# Patient Record
Sex: FEMALE | Race: WHITE | NOT HISPANIC OR LATINO | Employment: OTHER | URBAN - METROPOLITAN AREA
[De-identification: names, ages, dates, MRNs, and addresses within clinical notes are randomized per-mention and may not be internally consistent; named-entity substitution may affect disease eponyms.]

---

## 2023-03-09 ENCOUNTER — TELEPHONE (OUTPATIENT)
Dept: PRIMARY CARE | Facility: CLINIC | Age: 63
End: 2023-03-09

## 2023-08-23 DIAGNOSIS — J45.909 ACUTE ASTHMATIC BRONCHITIS (HHS-HCC): Primary | ICD-10-CM

## 2023-08-23 PROBLEM — G56.01 CARPAL TUNNEL SYNDROME, RIGHT: Status: ACTIVE | Noted: 2023-08-23

## 2023-08-23 PROBLEM — M85.80 OSTEOPENIA: Status: ACTIVE | Noted: 2023-08-23

## 2023-08-23 PROBLEM — G56.21 CUBITAL TUNNEL SYNDROME ON RIGHT: Status: ACTIVE | Noted: 2023-08-23

## 2023-08-23 PROBLEM — M54.31 RIGHT SIDED SCIATICA: Status: ACTIVE | Noted: 2023-08-23

## 2023-08-23 PROBLEM — M15.9 GENERALIZED OSTEOARTHRITIS: Status: ACTIVE | Noted: 2023-08-23

## 2023-08-23 RX ORDER — CLARITHROMYCIN 500 MG/1
TABLET, FILM COATED ORAL
COMMUNITY
Start: 2022-12-09 | End: 2024-01-31 | Stop reason: ALTCHOICE

## 2023-08-23 RX ORDER — ALBUTEROL SULFATE 90 UG/1
AEROSOL, METERED RESPIRATORY (INHALATION)
COMMUNITY
Start: 2017-03-27

## 2023-08-23 RX ORDER — DIFLUPREDNATE OPHTHALMIC 0.5 MG/ML
EMULSION OPHTHALMIC
COMMUNITY
Start: 2023-02-09 | End: 2024-01-31 | Stop reason: ALTCHOICE

## 2023-08-23 RX ORDER — LATANOPROST 50 UG/ML
SOLUTION/ DROPS OPHTHALMIC
COMMUNITY
Start: 2022-12-31

## 2023-08-23 RX ORDER — OFLOXACIN 3 MG/ML
SOLUTION/ DROPS OPHTHALMIC
COMMUNITY
Start: 2022-10-10 | End: 2024-01-31 | Stop reason: ALTCHOICE

## 2023-08-23 RX ORDER — FLUTICASONE PROPIONATE AND SALMETEROL 250; 50 UG/1; UG/1
1 POWDER RESPIRATORY (INHALATION) 2 TIMES DAILY
Qty: 60 EACH | Refills: 11 | Status: SHIPPED | OUTPATIENT
Start: 2023-08-23

## 2023-08-23 RX ORDER — FLUTICASONE PROPIONATE AND SALMETEROL 50; 250 UG/1; UG/1
1 POWDER RESPIRATORY (INHALATION) 2 TIMES DAILY
COMMUNITY
Start: 2023-01-26 | End: 2023-08-23 | Stop reason: SDUPTHER

## 2023-08-23 RX ORDER — PREDNISONE 20 MG/1
TABLET ORAL
COMMUNITY
Start: 2023-01-05 | End: 2024-01-31 | Stop reason: ALTCHOICE

## 2023-08-23 RX ORDER — BRIMONIDINE TARTRATE AND TIMOLOL MALEATE 2; 5 MG/ML; MG/ML
SOLUTION OPHTHALMIC
COMMUNITY
Start: 2023-01-21 | End: 2024-01-31 | Stop reason: ALTCHOICE

## 2023-08-23 RX ORDER — PEAK FLOW METER
EACH MISCELLANEOUS
COMMUNITY
Start: 2022-12-09

## 2023-08-23 RX ORDER — PREDNISOLONE ACETATE 10 MG/ML
SUSPENSION/ DROPS OPHTHALMIC
COMMUNITY
Start: 2022-12-03 | End: 2024-01-31 | Stop reason: ALTCHOICE

## 2023-08-23 RX ORDER — IPRATROPIUM BROMIDE AND ALBUTEROL SULFATE 2.5; .5 MG/3ML; MG/3ML
SOLUTION RESPIRATORY (INHALATION)
COMMUNITY
Start: 2023-01-03

## 2023-08-23 RX ORDER — AZITHROMYCIN 250 MG/1
TABLET, FILM COATED ORAL
COMMUNITY
End: 2024-01-31 | Stop reason: ALTCHOICE

## 2024-01-31 ENCOUNTER — OFFICE VISIT (OUTPATIENT)
Dept: PRIMARY CARE | Facility: CLINIC | Age: 64
End: 2024-01-31
Payer: COMMERCIAL

## 2024-01-31 VITALS
OXYGEN SATURATION: 97 % | SYSTOLIC BLOOD PRESSURE: 130 MMHG | WEIGHT: 138.6 LBS | HEART RATE: 79 BPM | BODY MASS INDEX: 23.79 KG/M2 | DIASTOLIC BLOOD PRESSURE: 82 MMHG

## 2024-01-31 DIAGNOSIS — J06.9 URI, ACUTE: Primary | ICD-10-CM

## 2024-01-31 PROCEDURE — 1036F TOBACCO NON-USER: CPT | Performed by: STUDENT IN AN ORGANIZED HEALTH CARE EDUCATION/TRAINING PROGRAM

## 2024-01-31 PROCEDURE — 99213 OFFICE O/P EST LOW 20 MIN: CPT | Performed by: STUDENT IN AN ORGANIZED HEALTH CARE EDUCATION/TRAINING PROGRAM

## 2024-01-31 RX ORDER — AZITHROMYCIN 250 MG/1
TABLET, FILM COATED ORAL
Qty: 6 TABLET | Refills: 0 | Status: SHIPPED | OUTPATIENT
Start: 2024-01-31 | End: 2024-02-04

## 2024-01-31 RX ORDER — METHYLPREDNISOLONE 4 MG/1
TABLET ORAL
Qty: 21 TABLET | Refills: 0 | Status: SHIPPED | OUTPATIENT
Start: 2024-01-31

## 2024-01-31 RX ORDER — BENZONATATE 200 MG/1
200 CAPSULE ORAL 3 TIMES DAILY PRN
Qty: 45 CAPSULE | Refills: 0 | Status: SHIPPED | OUTPATIENT
Start: 2024-01-31 | End: 2024-02-15

## 2024-01-31 ASSESSMENT — ENCOUNTER SYMPTOMS: DEPRESSION: 0

## 2024-01-31 ASSESSMENT — PAIN SCALES - GENERAL: PAINLEVEL: 2

## 2024-01-31 NOTE — PATIENT INSTRUCTIONS
1.  Lingering upper respiratory issues postnasal drip irritation and sinus congestion.  Antibiotic and steroid each as directed Tessalon Perles as needed for cough.    Drinking plenty of fluids and getting lots of rest. Chicken soup and hot beverages may help.  Trial of nasal irrigation with a Nettipot or squeeze bottle with sterile salt water.  Nasal spray corticosteroids (Flonase) may help in reducing the inflammatory response in the nasal passages and airways. Please try 2 sprays each nostril daily for 2 weeks.  If you have season allergies, please take a daily antihistamine of your choice, such as Zyrtec/Claritin/Allegra at bedtime.  Take Tylenol or Motrin/Aleve for sinus or ear pain.  If you have good blood pressure you can try pseudofed (you must ask the pharmacist for it and show ID).  Please take your antibiotic until all gone. Also take a probiotic or daily yogurt to help reduce common side effects of upset stomach and diarrhea.  Please call or return to the office if you are not feeling better in the next 3-4 days after starting treatment.

## 2024-01-31 NOTE — PROGRESS NOTES
Subjective   Patient ID: Melody Santos is a 63 y.o. female who presents for Cough, Oral Pain, Rib Injury (Left side.), and Med Refill.    HPI comes in with upper respiratory sinus pressure congestion irritating cough symptoms for 3 to 4 weeks    Review of Systems  Constitutional: Symptoms as per history of present   Eyes: no blurred vision or visual disturbance  ENT: no hearing loss, positive symptoms as per history of present illness  Cardiovascular: no chest pain, no edema, no palps and no syncope.   Respiratory: symptoms as per history of present illness  Gastrointestinal: no abdominal pain, No C/D no N/V, no blood in stools  Genitourinary: no dysuria, no change in urinary frequency, no urinary hesitancy and no feelings of urinary urgency.   Musculoskeletal: no arthralgias,  no back pain and no myalgias.   Neurological: no difficulty walking, no headache, no limb weakness, no numbness and no tingling.    Objective   /82 (BP Location: Left arm, Patient Position: Sitting, BP Cuff Size: Adult)   Pulse 79   Wt 62.9 kg (138 lb 9.6 oz)   SpO2 97%   BMI 23.79 kg/m²     Physical Exam  gen- a & o x 3, positive coughing  heent- eomi, perrla, positive fluid behind TM's however non-erythematous, positive postnasal drip, positive rhinorrhea inflamed mucous membranes in the nasopharynx, positive sinus TTP  neck- positive cervical lymphadenopathy  heart- rrr, no murmurs  lungs- cta b/l , no w/r/r  chest- symmetric, nontender  ab- soft, nontender, no organomegaly, +bowel sounds  ex's- no c/c/e  neuro- CNs 2-12 grossly intact, full sensation and strength in all ex's    Assessment/Plan     1.  Lingering upper respiratory issues postnasal drip irritation and sinus congestion.  Antibiotic and steroid each as directed Tessalon Perles as needed for cough.    Drinking plenty of fluids and getting lots of rest. Chicken soup and hot beverages may help.  Trial of nasal irrigation with a Nettipot or squeeze bottle with  sterile salt water.  Nasal spray corticosteroids (Flonase) may help in reducing the inflammatory response in the nasal passages and airways. Please try 2 sprays each nostril daily for 2 weeks.  If you have season allergies, please take a daily antihistamine of your choice, such as Zyrtec/Claritin/Allegra at bedtime.  Take Tylenol or Motrin/Aleve for sinus or ear pain.  If you have good blood pressure you can try pseudofed (you must ask the pharmacist for it and show ID).  Please take your antibiotic until all gone. Also take a probiotic or daily yogurt to help reduce common side effects of upset stomach and diarrhea.  Please call or return to the office if you are not feeling better in the next 3-4 days after starting treatment.

## 2024-02-05 ENCOUNTER — APPOINTMENT (OUTPATIENT)
Dept: PRIMARY CARE | Facility: CLINIC | Age: 64
End: 2024-02-05
Payer: COMMERCIAL

## 2024-06-28 DIAGNOSIS — J06.9 URI, ACUTE: Primary | ICD-10-CM

## 2024-07-02 RX ORDER — ALBUTEROL SULFATE 90 UG/1
2 AEROSOL, METERED RESPIRATORY (INHALATION) EVERY 6 HOURS PRN
Qty: 18 G | Refills: 3 | Status: SHIPPED | OUTPATIENT
Start: 2024-07-02

## 2025-03-26 ENCOUNTER — OFFICE VISIT (OUTPATIENT)
Dept: URGENT CARE | Age: 65
End: 2025-03-26
Payer: MEDICARE

## 2025-03-26 VITALS
WEIGHT: 146 LBS | SYSTOLIC BLOOD PRESSURE: 142 MMHG | DIASTOLIC BLOOD PRESSURE: 72 MMHG | TEMPERATURE: 97.8 F | BODY MASS INDEX: 25.87 KG/M2 | HEART RATE: 79 BPM | OXYGEN SATURATION: 96 % | RESPIRATION RATE: 16 BRPM | HEIGHT: 63 IN

## 2025-03-26 DIAGNOSIS — J45.41 MODERATE PERSISTENT ASTHMA WITH EXACERBATION (HHS-HCC): Primary | ICD-10-CM

## 2025-03-26 DIAGNOSIS — B37.0 THRUSH: ICD-10-CM

## 2025-03-26 PROCEDURE — 1036F TOBACCO NON-USER: CPT | Performed by: PHYSICIAN ASSISTANT

## 2025-03-26 PROCEDURE — 99203 OFFICE O/P NEW LOW 30 MIN: CPT | Performed by: PHYSICIAN ASSISTANT

## 2025-03-26 PROCEDURE — 1159F MED LIST DOCD IN RCRD: CPT | Performed by: PHYSICIAN ASSISTANT

## 2025-03-26 PROCEDURE — 3008F BODY MASS INDEX DOCD: CPT | Performed by: PHYSICIAN ASSISTANT

## 2025-03-26 RX ORDER — PREDNISONE 10 MG/1
TABLET ORAL
Qty: 30 TABLET | Refills: 0 | Status: SHIPPED | OUTPATIENT
Start: 2025-03-26

## 2025-03-26 RX ORDER — NYSTATIN 100000 [USP'U]/ML
5 SUSPENSION ORAL 4 TIMES DAILY
Qty: 140 ML | Refills: 2 | Status: SHIPPED | OUTPATIENT
Start: 2025-03-26 | End: 2025-04-02

## 2025-03-26 RX ORDER — ALBUTEROL SULFATE 90 UG/1
2 INHALANT RESPIRATORY (INHALATION) EVERY 6 HOURS PRN
Qty: 18 G | Refills: 0 | Status: SHIPPED | OUTPATIENT
Start: 2025-03-26 | End: 2026-03-26

## 2025-03-26 RX ORDER — FLUTICASONE PROPIONATE AND SALMETEROL 250; 50 UG/1; UG/1
1 POWDER RESPIRATORY (INHALATION)
Qty: 60 EACH | Refills: 2 | Status: SHIPPED | OUTPATIENT
Start: 2025-03-26 | End: 2026-03-26

## 2025-03-26 ASSESSMENT — PATIENT HEALTH QUESTIONNAIRE - PHQ9
2. FEELING DOWN, DEPRESSED OR HOPELESS: NOT AT ALL
SUM OF ALL RESPONSES TO PHQ9 QUESTIONS 1 AND 2: 0
1. LITTLE INTEREST OR PLEASURE IN DOING THINGS: NOT AT ALL

## 2025-03-26 NOTE — PROGRESS NOTES
Subjective   Patient ID: Melody Santos is a 65 y.o. female. They present today with a chief complaint of Asthma exacerbation (Patient had an asthma attack yesterday, used inhaler and nebulizer).    History of Present Illness  HPI     65-year-old patient presents to clinic with complaints of asthma attack yesterday.  Reports had multiple episodes of harsh dry cough with associated wheezing and shortness of breath which was not relieved with albuterol inhaler initially but then was relieved with nebulized albuterol   After patient stopped taking Advair about 4 days ago due to thrush in the mouth.   Reports albuterol at home is  and Advair inhaler patient has has also .  Reports has history of asthma.    Reports had to change PCPs and does not have an appointment with new PCP until  which is why medications are .  Denies fevers, chills, nausea, vomiting, dizziness, shortness of breath currently, nasal congestion, sore throat, ear pain, sinus pain.  Past Medical History  Allergies as of 2025 - Reviewed 2025   Allergen Reaction Noted    Penicillins Anaphylaxis 2023       (Not in a hospital admission)       Past Medical History:   Diagnosis Date    Acute pharyngitis, unspecified     Sore throat    Fracture of one rib, left side, initial encounter for closed fracture     Left rib fracture    Personal history of diseases of the skin and subcutaneous tissue     History of alopecia    Personal history of other diseases of the respiratory system     History of sinusitis    Personal history of other infectious and parasitic diseases     History of herpes zoster    Personal history of other specified conditions     History of headache    Rash and other nonspecific skin eruption     Rash       Past Surgical History:   Procedure Laterality Date    BACK SURGERY  2016    Back Surgery    FOOT SURGERY  2016    Foot Surgery Right    OTHER SURGICAL HISTORY  06/15/2016    Spinal  "Diskectomy Lumbar        reports that she quit smoking about 25 years ago. Her smoking use included cigarettes. She has never used smokeless tobacco. She reports current alcohol use. She reports that she does not use drugs.    Review of Systems  Review of Systems     ROS negative with the exception as noted on HPI                            Objective    Vitals:    03/26/25 1124   BP: 142/72   BP Location: Left arm   Pulse: 79   Resp: 16   Temp: 36.6 °C (97.8 °F)   SpO2: 96%   Weight: 66.2 kg (146 lb)   Height: 1.6 m (5' 3\")     No LMP recorded.    Physical Exam  Constitutional:       Appearance: Normal appearance.   HENT:      Head: Normocephalic and atraumatic.      Right Ear: Tympanic membrane, ear canal and external ear normal.      Left Ear: Tympanic membrane, ear canal and external ear normal.      Nose: No mucosal edema or rhinorrhea.      Right Sinus: No maxillary sinus tenderness or frontal sinus tenderness.      Left Sinus: No maxillary sinus tenderness or frontal sinus tenderness.      Mouth/Throat:      Lips: Pink.      Mouth: Mucous membranes are moist. No oral lesions.      Dentition: Normal dentition. No gingival swelling.      Tongue: No lesions. Tongue does not deviate from midline.      Palate: No mass and lesions.      Pharynx: No pharyngeal swelling, posterior oropharyngeal erythema, uvula swelling or postnasal drip.      Comments: Erythematous   Tongue with white film.  Cardiovascular:      Rate and Rhythm: Normal rate and regular rhythm.      Heart sounds: No murmur heard.  Pulmonary:      Effort: Pulmonary effort is normal. No respiratory distress.      Breath sounds: No stridor. Wheezing present. No rhonchi or rales.   Lymphadenopathy:      Cervical: No cervical adenopathy.   Neurological:      Mental Status: She is alert.         Procedures    Point of Care Test & Imaging Results from this visit  No results found for this visit on 03/26/25.   No results found.    Diagnostic study results (if " any) were reviewed by Eileen Teixeira PA-C.    Assessment/Plan   Allergies, medications, history, and pertinent labs/EKGs/Imaging reviewed by Eileen Teixeira PA-C.   asthma attack yesterday.  Reports had multiple episodes of harsh dry cough with associated wheezing and shortness of breath which was not relieved with albuterol inhaler initially but then was relieved with nebulized albuterol   After patient stopped taking Advair about 4 days ago due to thrush in the mouth.   Prednisone taper started for asthma exacerbation.  Advair refilled.  Albuterol refilled.  Patient reports Advair was $500 last time patient tried to  a new inhaler.  Discussed with patient if unable to play for Advair or if it is still very expensive patient can call back today and I will send in budesonide for the nebulizer until patient is able to see PCP.  Will start nystatin swish for thrush in the mouth. Advised to drink plenty of fluids, run a cool-mist humidifier in room at night, and get plenty of rest. Pt. is advised to take 10 deep breaths and hours and continue to walk around every couple of hours. Patient should avoid over-exertion and reduce exposure to irritants such as smoke, cold, dry air, and dust.  Risk, benefits, and potential side effects of medication(s) discussed with pt. Discussed disease/illness presentation, treatment options, progression, complications, and outcomes with patient. Pt. Has expressed understanding and is an agreement of plan of care.     Medical Decision Making      Orders and Diagnoses  Diagnoses and all orders for this visit:  Moderate persistent asthma with exacerbation ([facility])  -     predniSONE (Deltasone) 10 mg tablet; 4 tablets x 3 days, followed by 3 tablets x 3 days, followed by 2 tablets x 3 days, followed by 1 tablet x 3 days.  -     albuterol 90 mcg/actuation inhaler; Inhale 2 puffs every 6 hours if needed for wheezing.  -     fluticasone propion-salmeteroL (Advair Diskus)  250-50 mcg/dose diskus inhaler; Inhale 1 puff 2 times a day. Rinse mouth with water after use to reduce aftertaste and incidence of candidiasis. Do not swallow.  Thrush  -     nystatin (Mycostatin) 100,000 unit/mL suspension; Take 5 mL (500,000 Units) by mouth 4 times a day for 7 days.      Medical Admin Record      Patient disposition: Home    Electronically signed by Eileen Teixeira PA-C  12:00 PM

## 2025-07-03 ENCOUNTER — OFFICE VISIT (OUTPATIENT)
Dept: URGENT CARE | Age: 65
End: 2025-07-03
Payer: MEDICARE

## 2025-07-03 ENCOUNTER — ANCILLARY PROCEDURE (OUTPATIENT)
Dept: URGENT CARE | Age: 65
End: 2025-07-03
Payer: MEDICARE

## 2025-07-03 VITALS
SYSTOLIC BLOOD PRESSURE: 151 MMHG | WEIGHT: 140 LBS | OXYGEN SATURATION: 97 % | DIASTOLIC BLOOD PRESSURE: 81 MMHG | HEIGHT: 63 IN | BODY MASS INDEX: 24.8 KG/M2 | RESPIRATION RATE: 17 BRPM | TEMPERATURE: 97.8 F | HEART RATE: 65 BPM

## 2025-07-03 DIAGNOSIS — S29.9XXA RIB INJURY: Primary | ICD-10-CM

## 2025-07-03 DIAGNOSIS — S22.41XA CLOSED FRACTURE OF MULTIPLE RIBS OF RIGHT SIDE, INITIAL ENCOUNTER: ICD-10-CM

## 2025-07-03 DIAGNOSIS — S29.9XXA RIB INJURY: ICD-10-CM

## 2025-07-03 PROCEDURE — 71101 X-RAY EXAM UNILAT RIBS/CHEST: CPT | Mod: RIGHT SIDE | Performed by: PHYSICIAN ASSISTANT

## 2025-07-03 ASSESSMENT — PATIENT HEALTH QUESTIONNAIRE - PHQ9
2. FEELING DOWN, DEPRESSED OR HOPELESS: NOT AT ALL
1. LITTLE INTEREST OR PLEASURE IN DOING THINGS: NOT AT ALL
SUM OF ALL RESPONSES TO PHQ9 QUESTIONS 1 AND 2: 0

## 2025-07-03 ASSESSMENT — PAIN SCALES - GENERAL: PAINLEVEL_OUTOF10: 8

## 2025-07-03 NOTE — PATIENT INSTRUCTIONS
Pt. Is advised to take 10 to 15 slow deep breaths at least 4 times each day. My try holding a pillow to your chest when taking deep breaths, sneeze, cough, or laugh to help with the pain. May try sleeping head and shoulders propped up on pillows to help with pain.  Try applying cold compresses/ice to the area.  Try NSAIDs/Tylenol as needed for pain.  Monitor for signs of worsening pain, dizziness, shortness of breath, coughing up blood, signs of infection such as fevers, chills, nausea, vomiting and if these occur proceed to the ED.    Follow up with pcp in 2-4 weeks to make sure ribs are healing well.

## 2025-07-03 NOTE — PROGRESS NOTES
"Subjective   Patient ID: Melody Santos is a 65 y.o. female. They present today with a chief complaint of Injury (Hit ribs on arm of couch x 2 weeks ago ).    History of Present Illness    Injury    65-year-old patient presents to clinic with complaints of right lateral and anterior distal rib pain  ongoing for the past 2 weeks which occurred after patient hit the area against the arm of a chair.  Reports pain is worse with deep breaths, sneezing, rotational movements of the chest.   Reports the pain is sharp and moderate.   Denies fevers, chills, shortness of breath, cough, hemoptysis, numbness, tingling.    Past Medical History  Allergies as of 07/03/2025 - Reviewed 07/03/2025   Allergen Reaction Noted    Penicillins Anaphylaxis 08/23/2023       Prescriptions Prior to Admission[1]     Medical History[2]    Surgical History[3]     reports that she quit smoking about 25 years ago. Her smoking use included cigarettes. She has never used smokeless tobacco. She reports current alcohol use. She reports that she does not use drugs.    Review of Systems  Review of Systems  ROS negative with the exception as noted on HPI                           Objective    Vitals:    07/03/25 1029   BP: 151/81   Pulse: 65   Resp: 17   Temp: 36.6 °C (97.8 °F)   TempSrc: Oral   SpO2: 97%   Weight: 63.5 kg (140 lb)   Height: 1.6 m (5' 3\")     No LMP recorded (lmp unknown). Patient is postmenopausal.    Physical Exam  Constitutional:       Appearance: Normal appearance.   HENT:      Head: Normocephalic and atraumatic.   Cardiovascular:      Rate and Rhythm: Normal rate and regular rhythm.      Pulses: Normal pulses.      Heart sounds: Normal heart sounds.   Pulmonary:      Effort: Pulmonary effort is normal. No respiratory distress.      Breath sounds: Normal breath sounds. No stridor. No wheezing, rhonchi or rales.   Chest:      Chest wall: Tenderness (right anterior and lateral distal ribs tender to palpation) present.   Neurological: "      Mental Status: She is alert.      Sensory: No sensory deficit.      Motor: No weakness.      Deep Tendon Reflexes: Reflexes normal.         Procedures    Point of Care Test & Imaging Results from this visit  No results found for this visit on 07/03/25.   Imaging  XR ribs right 2 views w chest pa or ap  Result Date: 7/3/2025  Right anterolateral 7th and 8th rib fractures are favored subacute given reported injury 2 weeks prior.     MACRO: None   Signed by: James Bernal 7/3/2025 11:37 AM Dictation workstation:   [id number]      Cardiology, Vascular, and Other Imaging  No other imaging results found for the past 2 days      Diagnostic study results (if any) were reviewed by Eileen Teixeira PA-C.    Assessment/Plan   Allergies, medications, history, and pertinent labs/EKGs/Imaging reviewed by Eileen Teixeira PA-C.   right lateral and anterior distal rib pain  ongoing for the past 2 weeks which occurred after patient hit the area against the arm of a chair. Pt. Is advised to take 10 to 15 slow deep breaths at least 4 times each day. My try holding a pillow to your chest when taking deep breaths, sneeze, cough, or laugh to help with the pain. May try sleeping head and shoulders propped up on pillows to help with pain.  Try applying cold compresses/ice to the area.  Try NSAIDs/Tylenol as needed for pain.  Monitor for signs of worsening pain, dizziness, shortness of breath, coughing up blood, signs of infection such as fevers, chills, nausea, vomiting and if these occur proceed to the ED.  Follow up with pcp in 2-4 weeks to repeat xray. Risk, benefits, and potential side effects of medication(s) discussed with pt. Discussed disease/illness presentation, treatment options, progression, complications, and outcomes with patient. Pt. Has expressed understanding and is an agreement of plan of care.     Medical Decision Making      Orders and Diagnoses  Diagnoses and all orders for this visit:  Rib injury  -      XR ribs right 2 views w chest pa or ap; Future  Closed fracture of multiple ribs of right side, initial encounter  Other orders  -     Follow Up In Primary Care; Future      Medical Admin Record      Patient disposition: Home    Electronically signed by Eileen Teixeira PA-C  12:02 PM           [1] (Not in a hospital admission)   [2]   Past Medical History:  Diagnosis Date    Acute pharyngitis, unspecified     Sore throat    Fracture of one rib, left side, initial encounter for closed fracture     Left rib fracture    Personal history of diseases of the skin and subcutaneous tissue     History of alopecia    Personal history of other diseases of the respiratory system     History of sinusitis    Personal history of other infectious and parasitic diseases     History of herpes zoster    Personal history of other specified conditions     History of headache    Rash and other nonspecific skin eruption     Rash   [3]   Past Surgical History:  Procedure Laterality Date    BACK SURGERY  07/07/2016    Back Surgery    FOOT SURGERY  07/07/2016    Foot Surgery Right    OTHER SURGICAL HISTORY  06/15/2016    Spinal Diskectomy Lumbar

## 2025-07-21 ENCOUNTER — APPOINTMENT (OUTPATIENT)
Dept: PRIMARY CARE | Facility: CLINIC | Age: 65
End: 2025-07-21
Payer: MEDICARE

## 2025-07-21 VITALS
WEIGHT: 135.8 LBS | HEIGHT: 62 IN | DIASTOLIC BLOOD PRESSURE: 78 MMHG | OXYGEN SATURATION: 96 % | RESPIRATION RATE: 12 BRPM | SYSTOLIC BLOOD PRESSURE: 128 MMHG | HEART RATE: 77 BPM | BODY MASS INDEX: 24.99 KG/M2 | TEMPERATURE: 97.6 F

## 2025-07-21 DIAGNOSIS — Z12.31 ENCOUNTER FOR SCREENING MAMMOGRAM FOR MALIGNANT NEOPLASM OF BREAST: ICD-10-CM

## 2025-07-21 DIAGNOSIS — Z00.00 MEDICARE ANNUAL WELLNESS VISIT, INITIAL: Primary | ICD-10-CM

## 2025-07-21 DIAGNOSIS — Z12.11 ENCOUNTER FOR SCREENING FOR MALIGNANT NEOPLASM OF COLON: ICD-10-CM

## 2025-07-21 DIAGNOSIS — Z13.220 SCREENING CHOLESTEROL LEVEL: ICD-10-CM

## 2025-07-21 DIAGNOSIS — Z78.0 MENOPAUSE PRESENT: ICD-10-CM

## 2025-07-21 DIAGNOSIS — Z13.29 THYROID DISORDER SCREEN: ICD-10-CM

## 2025-07-21 DIAGNOSIS — J45.20 MILD INTERMITTENT ASTHMA WITHOUT COMPLICATION (HHS-HCC): ICD-10-CM

## 2025-07-21 PROCEDURE — 3008F BODY MASS INDEX DOCD: CPT | Performed by: INTERNAL MEDICINE

## 2025-07-21 PROCEDURE — 1158F ADVNC CARE PLAN TLK DOCD: CPT | Performed by: INTERNAL MEDICINE

## 2025-07-21 PROCEDURE — 1123F ACP DISCUSS/DSCN MKR DOCD: CPT | Performed by: INTERNAL MEDICINE

## 2025-07-21 PROCEDURE — 1159F MED LIST DOCD IN RCRD: CPT | Performed by: INTERNAL MEDICINE

## 2025-07-21 PROCEDURE — G0438 PPPS, INITIAL VISIT: HCPCS | Performed by: INTERNAL MEDICINE

## 2025-07-21 PROCEDURE — 1170F FXNL STATUS ASSESSED: CPT | Performed by: INTERNAL MEDICINE

## 2025-07-21 PROCEDURE — 99213 OFFICE O/P EST LOW 20 MIN: CPT | Performed by: INTERNAL MEDICINE

## 2025-07-21 RX ORDER — MONTELUKAST SODIUM 10 MG/1
10 TABLET ORAL NIGHTLY
Qty: 30 TABLET | Refills: 11 | Status: SHIPPED | OUTPATIENT
Start: 2025-07-21 | End: 2026-01-17

## 2025-07-21 ASSESSMENT — ACTIVITIES OF DAILY LIVING (ADL)
DOING_HOUSEWORK: INDEPENDENT
TAKING_MEDICATION: INDEPENDENT
MANAGING_FINANCES: INDEPENDENT
GROCERY_SHOPPING: INDEPENDENT
BATHING: INDEPENDENT
DRESSING: INDEPENDENT

## 2025-07-21 ASSESSMENT — PATIENT HEALTH QUESTIONNAIRE - PHQ9
SUM OF ALL RESPONSES TO PHQ9 QUESTIONS 1 AND 2: 0
1. LITTLE INTEREST OR PLEASURE IN DOING THINGS: NOT AT ALL
2. FEELING DOWN, DEPRESSED OR HOPELESS: NOT AT ALL

## 2025-07-21 ASSESSMENT — ENCOUNTER SYMPTOMS
LOSS OF SENSATION IN FEET: 0
DIZZINESS: 0
SLEEP DISTURBANCE: 0
OCCASIONAL FEELINGS OF UNSTEADINESS: 1
BLOOD IN STOOL: 0
HEADACHES: 0
CONSTIPATION: 0
FATIGUE: 0
SHORTNESS OF BREATH: 0
DEPRESSION: 0

## 2025-07-21 ASSESSMENT — VISUAL ACUITY
OD_CC: 20/25
OS_CC: 0/0

## 2025-07-21 NOTE — PROGRESS NOTES
"Subjective   Reason for Visit: Melody Santos is an 65 y.o. female here for a Medicare Wellness visit.     Past Medical, Surgical, and Family History reviewed and updated in chart.    Reviewed all medications by prescribing practitioner or clinical pharmacist (such as prescriptions, OTCs, herbal therapies and supplements) and documented in the medical record.    Pt presents to get established from Dr. Quiroz and for a MAW.    PMH:  -Asthma: Only has issues in the winter. Whenever she uses advair she gets thrush. Hasn't ever tried anything beyond that for maintenance therapy. Only uses albuterol in the winter; at that time does it around twice a week.  -Cataracts: Had surgery in 2021. Then had a torn retina in 2022, followed by a detached retina which required surgery. In 2023 had the same thing occur, so silicon oil placed. Then it was removed, but not everything got removed so surgery required 8/2024. 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 Is currently seen by Dr. Regan.    Gets 7-8 hours of sleep a night.        Patient Care Team:  Sherif Harvey MD as PCP - General (Internal Medicine)     Pt is not considered to be at high risk of opioid abuse after reviewing chart.    Review of Systems   Constitutional:  Negative for fatigue.   Respiratory:  Negative for shortness of breath.    Cardiovascular:  Negative for chest pain.   Gastrointestinal:  Negative for blood in stool and constipation.   Neurological:  Negative for dizziness and headaches.   Psychiatric/Behavioral:  Negative for sleep disturbance.        Objective   Vitals:  /78 (BP Location: Right arm, Patient Position: Sitting)   Pulse 77   Temp 36.4 °C (97.6 °F) (Tympanic)   Resp 12   Ht 1.575 m (5' 2\")   Wt 61.6 kg (135 lb 12.8 oz)   LMP  (LMP Unknown)   SpO2 96%   BMI 24.84 kg/m²       Physical Exam  Constitutional:       General: She is not in acute distress.     Appearance: She is not ill-appearing, toxic-appearing or " diaphoretic.   HENT:      Head: Normocephalic and atraumatic.     Eyes:      Conjunctiva/sclera: Conjunctivae normal.       Cardiovascular:      Rate and Rhythm: Normal rate and regular rhythm.      Heart sounds: No murmur heard.     No friction rub. No gallop.   Pulmonary:      Effort: Pulmonary effort is normal. No respiratory distress.      Breath sounds: No stridor. No wheezing, rhonchi or rales.   Abdominal:      General: Abdomen is flat. Bowel sounds are normal. There is no distension.      Palpations: Abdomen is soft.      Tenderness: There is no abdominal tenderness. There is no guarding.     Musculoskeletal:      Cervical back: Normal range of motion. No rigidity or tenderness.   Lymphadenopathy:      Cervical: No cervical adenopathy.     Skin:     General: Skin is warm and dry.     Neurological:      Mental Status: She is alert.         Assessment/Plan   Assessment & Plan  Medicare annual wellness visit, initial         Mild intermittent asthma without complication ([facility])  -Has dealt w/ this for years. Only gets symptomatic in the winter and takes albuterol twice a week at that time. Will take Advair then as well, but this isn't covered by insurance and it gives her thrush. Hasn't tried anything else.  -[employer] coverage reviewed; no maintenance inhaler is lower than tier 3. Will try singulair to avoid thrush. Cautioned on side effects. If it works, then may be able to make it so we only take this in the winter. Pt agreeable with plan.  Orders:    Comprehensive metabolic panel; Future    CBC; Future    montelukast (Singulair) 10 mg tablet; Take 1 tablet (10 mg) by mouth once daily at bedtime.    Screening cholesterol level    Orders:    Lipid panel; Future    Thyroid disorder screen    Orders:    Tsh With Reflex To Free T4 If Abnormal; Future    Encounter for screening mammogram for malignant neoplasm of breast    Orders:    BI mammo bilateral screening tomosynthesis; Future    Encounter for screening  for malignant neoplasm of colon    Orders:    Cologuard® colon cancer screening; Future    Menopause present    Orders:    XR DEXA bone density; Future     -Labwork as above.  -Pt agreeable with mammogram. Hasn't ever done colon cancer screening but would like Cologuard. Understands its a little less accurate than colonoscopy. Has no hx colon cancer in family. Is agreeable with DEXA as well.  -Next visit will discuss vaccines more in detail.    Advance Directives Discussion  Advanced Care Planning (including a Living Will, Healthcare POA, as well as specific end of life choices and/or directives), was discussed with the patient and/or surrogate, voluntarily, and details of that discussion documented in the Problem List (under Advanced Directives Discussion) of the medical record.  Pt has a living will and HCPOA. Her  is her HCPOA, followed by her brother Tobi as an alternate. Contact added to chart. Pt confirms she wishes to be full code.   (~16 min spent discussing above)

## 2025-07-29 ENCOUNTER — HOSPITAL ENCOUNTER (OUTPATIENT)
Dept: RADIOLOGY | Facility: CLINIC | Age: 65
Discharge: HOME | End: 2025-07-29
Payer: MEDICARE

## 2025-07-29 ENCOUNTER — RESULTS FOLLOW-UP (OUTPATIENT)
Dept: PRIMARY CARE | Facility: CLINIC | Age: 65
End: 2025-07-29

## 2025-07-29 VITALS — HEIGHT: 62 IN | WEIGHT: 135.8 LBS | BODY MASS INDEX: 24.99 KG/M2

## 2025-07-29 DIAGNOSIS — Z12.31 ENCOUNTER FOR SCREENING MAMMOGRAM FOR MALIGNANT NEOPLASM OF BREAST: ICD-10-CM

## 2025-07-29 DIAGNOSIS — R92.8 ABNORMAL MAMMOGRAM: Primary | ICD-10-CM

## 2025-07-29 PROCEDURE — 77063 BREAST TOMOSYNTHESIS BI: CPT | Performed by: RADIOLOGY

## 2025-07-29 PROCEDURE — 77067 SCR MAMMO BI INCL CAD: CPT | Performed by: RADIOLOGY

## 2025-07-29 PROCEDURE — 77063 BREAST TOMOSYNTHESIS BI: CPT

## 2025-07-29 NOTE — TELEPHONE ENCOUNTER
Patient notified.       ----- Message from Sherif Harvey sent at 7/29/2025  1:17 PM EDT -----  Asymmetry seen on mammogram, in addition to them being notably dense. Recommendation is to get further imaging to learn more about what we are seeing. Orders placed. Please schedule appt when   possible for this.  ----- Message -----  From: Interface, Radiology Results In  Sent: 7/29/2025  10:49 AM EDT  To: Sherif Harvey MD

## 2025-07-30 LAB
ALBUMIN SERPL-MCNC: 4.6 G/DL (ref 3.6–5.1)
ALP SERPL-CCNC: 57 U/L (ref 37–153)
ALT SERPL-CCNC: 16 U/L (ref 6–29)
ANION GAP SERPL CALCULATED.4IONS-SCNC: 8 MMOL/L (CALC) (ref 7–17)
AST SERPL-CCNC: 23 U/L (ref 10–35)
BILIRUB SERPL-MCNC: 0.8 MG/DL (ref 0.2–1.2)
BUN SERPL-MCNC: 14 MG/DL (ref 7–25)
CALCIUM SERPL-MCNC: 9.4 MG/DL (ref 8.6–10.4)
CHLORIDE SERPL-SCNC: 107 MMOL/L (ref 98–110)
CO2 SERPL-SCNC: 26 MMOL/L (ref 20–32)
CREAT SERPL-MCNC: 0.71 MG/DL (ref 0.5–1.05)
EGFRCR SERPLBLD CKD-EPI 2021: 94 ML/MIN/1.73M2
ERYTHROCYTE [DISTWIDTH] IN BLOOD BY AUTOMATED COUNT: 12.8 % (ref 11–15)
GLUCOSE SERPL-MCNC: 102 MG/DL (ref 65–99)
HCT VFR BLD AUTO: 47 % (ref 35–45)
HGB BLD-MCNC: 14.8 G/DL (ref 11.7–15.5)
MCH RBC QN AUTO: 30.6 PG (ref 27–33)
MCHC RBC AUTO-ENTMCNC: 31.5 G/DL (ref 32–36)
MCV RBC AUTO: 97.3 FL (ref 80–100)
PLATELET # BLD AUTO: 216 THOUSAND/UL (ref 140–400)
PMV BLD REES-ECKER: 10.4 FL (ref 7.5–12.5)
POTASSIUM SERPL-SCNC: 4.1 MMOL/L (ref 3.5–5.3)
PROT SERPL-MCNC: 6.9 G/DL (ref 6.1–8.1)
RBC # BLD AUTO: 4.83 MILLION/UL (ref 3.8–5.1)
SODIUM SERPL-SCNC: 141 MMOL/L (ref 135–146)
WBC # BLD AUTO: 4.3 THOUSAND/UL (ref 3.8–10.8)

## 2025-08-01 LAB — NONINV COLON CA DNA+OCC BLD SCRN STL QL: NEGATIVE

## 2025-08-14 ENCOUNTER — HOSPITAL ENCOUNTER (OUTPATIENT)
Dept: RADIOLOGY | Facility: CLINIC | Age: 65
Discharge: HOME | End: 2025-08-14
Payer: MEDICARE

## 2025-08-14 DIAGNOSIS — R92.8 ABNORMAL MAMMOGRAM: ICD-10-CM

## 2025-08-14 PROCEDURE — 76642 ULTRASOUND BREAST LIMITED: CPT | Performed by: RADIOLOGY

## 2025-08-14 PROCEDURE — 76642 ULTRASOUND BREAST LIMITED: CPT | Mod: LT

## 2025-08-14 PROCEDURE — 77062 BREAST TOMOSYNTHESIS BI: CPT | Performed by: RADIOLOGY

## 2025-08-14 PROCEDURE — 77062 BREAST TOMOSYNTHESIS BI: CPT

## 2025-08-14 PROCEDURE — 77066 DX MAMMO INCL CAD BI: CPT | Performed by: RADIOLOGY

## 2025-08-20 ENCOUNTER — HOSPITAL ENCOUNTER (OUTPATIENT)
Dept: RADIOLOGY | Facility: CLINIC | Age: 65
Discharge: HOME | End: 2025-08-20
Payer: MEDICARE

## 2025-08-20 DIAGNOSIS — Z78.0 MENOPAUSE PRESENT: ICD-10-CM

## 2025-08-20 PROCEDURE — 77080 DXA BONE DENSITY AXIAL: CPT

## 2025-08-20 PROCEDURE — 77080 DXA BONE DENSITY AXIAL: CPT | Performed by: RADIOLOGY

## 2026-07-22 ENCOUNTER — APPOINTMENT (OUTPATIENT)
Dept: PRIMARY CARE | Facility: CLINIC | Age: 66
End: 2026-07-22
Payer: MEDICARE